# Patient Record
Sex: MALE | Race: BLACK OR AFRICAN AMERICAN | Employment: OTHER | URBAN - METROPOLITAN AREA
[De-identification: names, ages, dates, MRNs, and addresses within clinical notes are randomized per-mention and may not be internally consistent; named-entity substitution may affect disease eponyms.]

---

## 2023-07-30 ENCOUNTER — HOSPITAL ENCOUNTER (EMERGENCY)
Age: 73
Discharge: HOME | End: 2023-07-30
Attending: STUDENT IN AN ORGANIZED HEALTH CARE EDUCATION/TRAINING PROGRAM

## 2023-07-30 ENCOUNTER — ANCILLARY PROCEDURE (OUTPATIENT)
Dept: EMERGENCY DEPT | Age: 73
End: 2023-07-30
Attending: STUDENT IN AN ORGANIZED HEALTH CARE EDUCATION/TRAINING PROGRAM

## 2023-07-30 VITALS
WEIGHT: 202.8 LBS | BODY MASS INDEX: 28.39 KG/M2 | HEIGHT: 71 IN | TEMPERATURE: 98.9 F | DIASTOLIC BLOOD PRESSURE: 119 MMHG | SYSTOLIC BLOOD PRESSURE: 163 MMHG | RESPIRATION RATE: 18 BRPM | OXYGEN SATURATION: 96 % | HEART RATE: 87 BPM

## 2023-07-30 DIAGNOSIS — R42 LIGHTHEADEDNESS: Primary | ICD-10-CM

## 2023-07-30 DIAGNOSIS — R79.89 CREATININE ELEVATION: ICD-10-CM

## 2023-07-30 LAB
ANION GAP SERPL CALCULATED.3IONS-SCNC: 12 MMOL/L (ref 3–16)
BASOPHILS # BLD: 0 K/UL (ref 0–0.2)
BASOPHILS NFR BLD: 0.4 %
BUN SERPL-MCNC: 34 MG/DL (ref 7–20)
CALCIUM SERPL-MCNC: 10.6 MG/DL (ref 8.3–10.6)
CHLORIDE SERPL-SCNC: 99 MMOL/L (ref 99–110)
CO2 SERPL-SCNC: 28 MMOL/L (ref 21–32)
CREAT SERPL-MCNC: 2 MG/DL (ref 0.8–1.3)
DEPRECATED RDW RBC AUTO: 14.4 % (ref 12.4–15.4)
EOSINOPHIL # BLD: 0.2 K/UL (ref 0–0.6)
EOSINOPHIL NFR BLD: 3.3 %
GFR SERPLBLD CREATININE-BSD FMLA CKD-EPI: 34 ML/MIN/{1.73_M2}
GLUCOSE SERPL-MCNC: 117 MG/DL (ref 70–99)
HCT VFR BLD AUTO: 45.6 % (ref 40.5–52.5)
HGB BLD-MCNC: 15.2 G/DL (ref 13.5–17.5)
LYMPHOCYTES # BLD: 1.6 K/UL (ref 1–5.1)
LYMPHOCYTES NFR BLD: 33.4 %
MCH RBC QN AUTO: 30.9 PG (ref 26–34)
MCHC RBC AUTO-ENTMCNC: 33.3 G/DL (ref 31–36)
MCV RBC AUTO: 92.7 FL (ref 80–100)
MONOCYTES # BLD: 0.6 K/UL (ref 0–1.3)
MONOCYTES NFR BLD: 13.8 %
NEUTROPHILS # BLD: 2.3 K/UL (ref 1.7–7.7)
NEUTROPHILS NFR BLD: 49.1 %
PLATELET # BLD AUTO: 233 K/UL (ref 135–450)
PMV BLD AUTO: 9.7 FL (ref 5–10.5)
POTASSIUM SERPL-SCNC: 4.8 MMOL/L (ref 3.5–5.1)
RBC # BLD AUTO: 4.92 M/UL (ref 4.2–5.9)
SLIDE REVIEW: NORMAL
SODIUM SERPL-SCNC: 139 MMOL/L (ref 136–145)
WBC # BLD AUTO: 4.7 K/UL (ref 4–11)

## 2023-07-30 PROCEDURE — 93308 TTE F-UP OR LMTD: CPT

## 2023-07-30 PROCEDURE — 80048 BASIC METABOLIC PNL TOTAL CA: CPT

## 2023-07-30 PROCEDURE — 2580000003 HC RX 258: Performed by: STUDENT IN AN ORGANIZED HEALTH CARE EDUCATION/TRAINING PROGRAM

## 2023-07-30 PROCEDURE — 99284 EMERGENCY DEPT VISIT MOD MDM: CPT

## 2023-07-30 PROCEDURE — 93005 ELECTROCARDIOGRAM TRACING: CPT | Performed by: STUDENT IN AN ORGANIZED HEALTH CARE EDUCATION/TRAINING PROGRAM

## 2023-07-30 PROCEDURE — 85025 COMPLETE CBC W/AUTO DIFF WBC: CPT

## 2023-07-30 RX ORDER — 0.9 % SODIUM CHLORIDE 0.9 %
500 INTRAVENOUS SOLUTION INTRAVENOUS ONCE
Status: COMPLETED | OUTPATIENT
Start: 2023-07-30 | End: 2023-07-30

## 2023-07-30 RX ADMIN — SODIUM CHLORIDE 500 ML: 9 INJECTION, SOLUTION INTRAVENOUS at 19:04

## 2023-07-30 ASSESSMENT — PAIN - FUNCTIONAL ASSESSMENT: PAIN_FUNCTIONAL_ASSESSMENT: NONE - DENIES PAIN

## 2023-07-30 ASSESSMENT — LIFESTYLE VARIABLES
HOW OFTEN DO YOU HAVE A DRINK CONTAINING ALCOHOL: NEVER
HOW MANY STANDARD DRINKS CONTAINING ALCOHOL DO YOU HAVE ON A TYPICAL DAY: PATIENT DOES NOT DRINK

## 2023-08-01 LAB
EKG ATRIAL RATE: 65 BPM
EKG DIAGNOSIS: NORMAL
EKG P AXIS: 64 DEGREES
EKG P-R INTERVAL: 170 MS
EKG Q-T INTERVAL: 382 MS
EKG QRS DURATION: 74 MS
EKG QTC CALCULATION (BAZETT): 397 MS
EKG R AXIS: 29 DEGREES
EKG T AXIS: 28 DEGREES
EKG VENTRICULAR RATE: 65 BPM

## 2023-08-02 ENCOUNTER — HOSPITAL ENCOUNTER (EMERGENCY)
Age: 73
Discharge: HOME | End: 2023-08-03
Attending: EMERGENCY MEDICINE
Payer: MEDICARE

## 2023-08-02 ENCOUNTER — APPOINTMENT (OUTPATIENT)
Dept: CT IMAGING | Age: 73
End: 2023-08-02
Payer: MEDICARE

## 2023-08-02 ENCOUNTER — APPOINTMENT (OUTPATIENT)
Dept: GENERAL RADIOLOGY | Age: 73
End: 2023-08-02
Payer: MEDICARE

## 2023-08-02 DIAGNOSIS — R41.0 DELIRIUM: ICD-10-CM

## 2023-08-02 DIAGNOSIS — E86.1 HYPOTENSION DUE TO HYPOVOLEMIA: ICD-10-CM

## 2023-08-02 DIAGNOSIS — I95.89 HYPOTENSION DUE TO HYPOVOLEMIA: ICD-10-CM

## 2023-08-02 DIAGNOSIS — E86.0 DEHYDRATION: Primary | ICD-10-CM

## 2023-08-02 LAB
ALBUMIN SERPL-MCNC: 4.6 G/DL (ref 3.4–5)
ALP SERPL-CCNC: 90 U/L (ref 40–129)
ALT SERPL-CCNC: 29 U/L (ref 10–40)
ANION GAP SERPL CALCULATED.3IONS-SCNC: 14 MMOL/L (ref 3–16)
ANION GAP SERPL CALCULATED.3IONS-SCNC: 20 MMOL/L (ref 3–16)
AST SERPL-CCNC: 28 U/L (ref 15–37)
BASE EXCESS BLDV CALC-SCNC: -9.1 MMOL/L (ref -2–3)
BASOPHILS # BLD: 0 K/UL (ref 0–0.2)
BASOPHILS NFR BLD: 0.4 %
BILIRUB DIRECT SERPL-MCNC: <0.2 MG/DL (ref 0–0.3)
BILIRUB INDIRECT SERPL-MCNC: NORMAL MG/DL (ref 0–1)
BILIRUB SERPL-MCNC: <0.2 MG/DL (ref 0–1)
BUN SERPL-MCNC: 38 MG/DL (ref 7–20)
BUN SERPL-MCNC: 40 MG/DL (ref 7–20)
CALCIUM SERPL-MCNC: 10 MG/DL (ref 8.3–10.6)
CALCIUM SERPL-MCNC: 8.7 MG/DL (ref 8.3–10.6)
CHLORIDE SERPL-SCNC: 103 MMOL/L (ref 99–110)
CHLORIDE SERPL-SCNC: 106 MMOL/L (ref 99–110)
CO2 BLDV-SCNC: 18 MMOL/L
CO2 SERPL-SCNC: 17 MMOL/L (ref 21–32)
CO2 SERPL-SCNC: 19 MMOL/L (ref 21–32)
COHGB MFR BLDV: 0.4 % (ref 0–1.5)
CREAT SERPL-MCNC: 1.9 MG/DL (ref 0.8–1.3)
CREAT SERPL-MCNC: 2 MG/DL (ref 0.8–1.3)
DEPRECATED RDW RBC AUTO: 14.3 % (ref 12.4–15.4)
DO-HGB MFR BLDV: 6.6 %
EOSINOPHIL # BLD: 0.1 K/UL (ref 0–0.6)
EOSINOPHIL NFR BLD: 1.8 %
GFR SERPLBLD CREATININE-BSD FMLA CKD-EPI: 34 ML/MIN/{1.73_M2}
GFR SERPLBLD CREATININE-BSD FMLA CKD-EPI: 37 ML/MIN/{1.73_M2}
GLUCOSE SERPL-MCNC: 130 MG/DL (ref 70–99)
GLUCOSE SERPL-MCNC: 87 MG/DL (ref 70–99)
HCO3 BLDV-SCNC: 16.4 MMOL/L (ref 24–28)
HCT VFR BLD AUTO: 44.6 % (ref 40.5–52.5)
HGB BLD-MCNC: 14.9 G/DL (ref 13.5–17.5)
LACTATE BLDV-SCNC: 1.7 MMOL/L (ref 0.4–2)
LACTATE BLDV-SCNC: 3.4 MMOL/L (ref 0.4–1.9)
LACTATE BLDV-SCNC: 4.4 MMOL/L (ref 0.4–1.9)
LYMPHOCYTES # BLD: 1.3 K/UL (ref 1–5.1)
LYMPHOCYTES NFR BLD: 35.3 %
MCH RBC QN AUTO: 30.8 PG (ref 26–34)
MCHC RBC AUTO-ENTMCNC: 33.5 G/DL (ref 31–36)
MCV RBC AUTO: 92 FL (ref 80–100)
METHGB MFR BLDV: 0.6 % (ref 0–1.5)
MONOCYTES # BLD: 0.5 K/UL (ref 0–1.3)
MONOCYTES NFR BLD: 12.6 %
NEUTROPHILS # BLD: 1.8 K/UL (ref 1.7–7.7)
NEUTROPHILS NFR BLD: 49.9 %
NT-PROBNP SERPL-MCNC: 127 PG/ML (ref 0–124)
PCO2 BLDV: 33.7 MMHG (ref 41–51)
PH BLDV: 7.3 [PH] (ref 7.35–7.45)
PLATELET # BLD AUTO: 238 K/UL (ref 135–450)
PMV BLD AUTO: 9.4 FL (ref 5–10.5)
PO2 BLDV: 82.5 MMHG (ref 25–40)
POTASSIUM SERPL-SCNC: 4.1 MMOL/L (ref 3.5–5.1)
POTASSIUM SERPL-SCNC: 4.6 MMOL/L (ref 3.5–5.1)
PROT SERPL-MCNC: 8.2 G/DL (ref 6.4–8.2)
RBC # BLD AUTO: 4.85 M/UL (ref 4.2–5.9)
SAO2 % BLDV: 93 %
SODIUM SERPL-SCNC: 139 MMOL/L (ref 136–145)
SODIUM SERPL-SCNC: 140 MMOL/L (ref 136–145)
TROPONIN, HIGH SENSITIVITY: 15 NG/L (ref 0–22)
TROPONIN, HIGH SENSITIVITY: 20 NG/L (ref 0–22)
WBC # BLD AUTO: 3.6 K/UL (ref 4–11)

## 2023-08-02 PROCEDURE — 85025 COMPLETE CBC W/AUTO DIFF WBC: CPT

## 2023-08-02 PROCEDURE — 36415 COLL VENOUS BLD VENIPUNCTURE: CPT

## 2023-08-02 PROCEDURE — 2580000003 HC RX 258: Performed by: PHYSICIAN ASSISTANT

## 2023-08-02 PROCEDURE — 93005 ELECTROCARDIOGRAM TRACING: CPT | Performed by: PHYSICIAN ASSISTANT

## 2023-08-02 PROCEDURE — 80048 BASIC METABOLIC PNL TOTAL CA: CPT

## 2023-08-02 PROCEDURE — 71045 X-RAY EXAM CHEST 1 VIEW: CPT

## 2023-08-02 PROCEDURE — 96360 HYDRATION IV INFUSION INIT: CPT

## 2023-08-02 PROCEDURE — 84484 ASSAY OF TROPONIN QUANT: CPT

## 2023-08-02 PROCEDURE — 82803 BLOOD GASES ANY COMBINATION: CPT

## 2023-08-02 PROCEDURE — 83880 ASSAY OF NATRIURETIC PEPTIDE: CPT

## 2023-08-02 PROCEDURE — 83605 ASSAY OF LACTIC ACID: CPT

## 2023-08-02 PROCEDURE — 80076 HEPATIC FUNCTION PANEL: CPT

## 2023-08-02 PROCEDURE — 70450 CT HEAD/BRAIN W/O DYE: CPT

## 2023-08-02 PROCEDURE — 99284 EMERGENCY DEPT VISIT MOD MDM: CPT

## 2023-08-02 RX ORDER — TAMSULOSIN HYDROCHLORIDE 0.4 MG/1
0.4 CAPSULE ORAL
COMMUNITY
Start: 2018-03-16

## 2023-08-02 RX ORDER — LISINOPRIL AND HYDROCHLOROTHIAZIDE 20; 12.5 MG/1; MG/1
TABLET ORAL
COMMUNITY
Start: 2023-08-01

## 2023-08-02 RX ORDER — SODIUM CHLORIDE, SODIUM LACTATE, POTASSIUM CHLORIDE, AND CALCIUM CHLORIDE .6; .31; .03; .02 G/100ML; G/100ML; G/100ML; G/100ML
1000 INJECTION, SOLUTION INTRAVENOUS ONCE
Status: COMPLETED | OUTPATIENT
Start: 2023-08-02 | End: 2023-08-02

## 2023-08-02 RX ORDER — 0.9 % SODIUM CHLORIDE 0.9 %
1000 INTRAVENOUS SOLUTION INTRAVENOUS ONCE
Status: COMPLETED | OUTPATIENT
Start: 2023-08-02 | End: 2023-08-02

## 2023-08-02 RX ADMIN — SODIUM CHLORIDE 1000 ML: 9 INJECTION, SOLUTION INTRAVENOUS at 20:22

## 2023-08-02 RX ADMIN — SODIUM CHLORIDE, POTASSIUM CHLORIDE, SODIUM LACTATE AND CALCIUM CHLORIDE 1000 ML: 600; 310; 30; 20 INJECTION, SOLUTION INTRAVENOUS at 18:00

## 2023-08-02 ASSESSMENT — LIFESTYLE VARIABLES
HOW MANY STANDARD DRINKS CONTAINING ALCOHOL DO YOU HAVE ON A TYPICAL DAY: 3 OR 4
HOW OFTEN DO YOU HAVE A DRINK CONTAINING ALCOHOL: 2-3 TIMES A WEEK

## 2023-08-02 ASSESSMENT — PAIN - FUNCTIONAL ASSESSMENT
PAIN_FUNCTIONAL_ASSESSMENT: NONE - DENIES PAIN
PAIN_FUNCTIONAL_ASSESSMENT: NONE - DENIES PAIN

## 2023-08-02 NOTE — ED NOTES
Noted call from Lab staff re: Lactic Acid of 4.4. , patient has ongoing IV fluid. Vitally stable at present.      Carole Abdalla RN  08/02/23 1911

## 2023-08-03 VITALS
HEIGHT: 71 IN | SYSTOLIC BLOOD PRESSURE: 122 MMHG | OXYGEN SATURATION: 100 % | BODY MASS INDEX: 28.28 KG/M2 | RESPIRATION RATE: 14 BRPM | DIASTOLIC BLOOD PRESSURE: 66 MMHG | WEIGHT: 202 LBS | HEART RATE: 84 BPM | TEMPERATURE: 97.6 F

## 2023-08-03 LAB
EKG ATRIAL RATE: 101 BPM
EKG DIAGNOSIS: NORMAL
EKG P AXIS: 75 DEGREES
EKG P-R INTERVAL: 172 MS
EKG Q-T INTERVAL: 324 MS
EKG QRS DURATION: 80 MS
EKG QTC CALCULATION (BAZETT): 420 MS
EKG R AXIS: 79 DEGREES
EKG T AXIS: 43 DEGREES
EKG VENTRICULAR RATE: 101 BPM

## 2023-08-03 NOTE — ED PROVIDER NOTES
ED Attending Attestation Note     Date of evaluation: 8/2/2023    This patient was seen by the advance practice provider. I have seen and examined the patient, agree with the workup, evaluation, management and diagnosis. The care plan has been discussed. I have reviewed the ECG and concur with the LUIS ALFREDO's interpretation. My assessment reveals 69-year-old male who was found lethargic outside of his care facility today. Here he is confused but awake and conversant. Nissley hypotensive but responded well to IV fluids. Lactate elevated and coming down. Appears he has had episodes of syncope previously which were related to alcohol use. May be some combination of this and heat exposure. Physical Exam  Constitutional:       Appearance: He is ill-appearing and diaphoretic. HENT:      Head: Normocephalic and atraumatic. Cardiovascular:      Rate and Rhythm: Normal rate and regular rhythm. Pulmonary:      Effort: Pulmonary effort is normal.   Abdominal:      General: There is no distension. Palpations: Abdomen is soft. Skin:     General: Skin is warm. Neurological:      Mental Status: He is lethargic. GCS: GCS eye subscore is 4. GCS verbal subscore is 5. GCS motor subscore is 5. Cranial Nerves: No dysarthria or facial asymmetry.             Yuri Munguia MD  08/02/23 4833

## 2023-08-03 NOTE — DISCHARGE INSTRUCTIONS
confusion, loss of balance or new clumsiness  New Seizures  Significant Head Injury  Eye Injuries or new vision changes  Severe Nausea and Vomiting that prevents you from eating, drinking and/or taking your medications  Significant or persistent fevers (Above 100.3 F in babies, over 80 F in adults, as an example)  Chest Pain  Shortness of Breath  Sudden, severe pain  Cuts that won't stop bleeding  Significant Linder  Bleeding during Pregnancy in women  Testicular Pain in men    Your Plan of Care after leaving our Emergency Department     You should read the following instructions before leaving the Emergency Department. If you have any questions about this Plan of Care, please don't hesitate to ask. It is important that you understand this Plan of Care so that you can achieve the best possible outcome from your illness or injury. IMPORTANT INSTRUCTIONS:    Hydrate, Hydrate, Hydrate!! Make sure you are drinking plenty of water and maintaining good food intake. Especially on days where you are going to be walking out in the heat. You should follow-up with:  Rolo Dominique [address] [place] [address]  [phone]    Call   Call first thing in the morning to let dr. Salvador Gamino know about your last 2 visits to the ED and to set up follow up for re-check in the next 1-2 weeks. [facility] Emergency Department  [facility] [address]  [address]  [phone]  Go to         You should call the number of the providers listed above to schedule follow-up appointments in the timeline recommended or to speak to a healthcare provider. These providers also have on-call clinicians available after hours and on weekends for urgent questions and can be reached by calling the same number. If you feel your issue is an emergency, please don't hesitate to return to the emergency department for evaluation.  If you can not safely and quickly get yourself to the emergency department, call 9-1-1 and

## 2023-09-13 ASSESSMENT — ENCOUNTER SYMPTOMS
RHINORRHEA: 0
NAUSEA: 0
SORE THROAT: 0
CHEST TIGHTNESS: 0
COLOR CHANGE: 0
COUGH: 0
DIARRHEA: 0
TROUBLE SWALLOWING: 0
SHORTNESS OF BREATH: 0
VOMITING: 0
EYE PAIN: 0
WHEEZING: 0
ABDOMINAL PAIN: 0
ABDOMINAL DISTENTION: 0

## 2023-10-21 NOTE — ED NOTES
Pt educated on discharge instructions and given discharge papers. Pt verbalized understanding discharge with no questions nor concerns. Pt ambulated to exit with stable gait.      Akila Wallace RN  07/30/23 7956
[facility]

## 2024-07-15 ENCOUNTER — APPOINTMENT (OUTPATIENT)
Dept: GENERAL RADIOLOGY | Age: 74
DRG: 897 | End: 2024-07-15
Payer: COMMERCIAL

## 2024-07-15 ENCOUNTER — APPOINTMENT (OUTPATIENT)
Dept: CT IMAGING | Age: 74
DRG: 897 | End: 2024-07-15
Payer: COMMERCIAL

## 2024-07-15 ENCOUNTER — HOSPITAL ENCOUNTER (INPATIENT)
Age: 74
LOS: 1 days | Discharge: HOME HEALTH | DRG: 897 | End: 2024-07-17
Attending: EMERGENCY MEDICINE | Admitting: INTERNAL MEDICINE
Payer: COMMERCIAL

## 2024-07-15 DIAGNOSIS — N17.9 AKI (ACUTE KIDNEY INJURY) (HCC): ICD-10-CM

## 2024-07-15 DIAGNOSIS — R55 SYNCOPE, UNSPECIFIED SYNCOPE TYPE: ICD-10-CM

## 2024-07-15 DIAGNOSIS — R55 SYNCOPE AND COLLAPSE: Primary | ICD-10-CM

## 2024-07-15 LAB
ALBUMIN SERPL-MCNC: 3.5 G/DL (ref 3.4–5)
ALP SERPL-CCNC: 92 U/L (ref 40–129)
ALT SERPL-CCNC: 26 U/L (ref 10–40)
ANION GAP SERPL CALCULATED.3IONS-SCNC: 15 MMOL/L (ref 3–16)
APTT BLD: 32.4 SEC (ref 22.1–36.4)
AST SERPL-CCNC: 27 U/L (ref 15–37)
BASOPHILS # BLD: 0 K/UL (ref 0–0.2)
BASOPHILS NFR BLD: 0.6 %
BILIRUB DIRECT SERPL-MCNC: <0.2 MG/DL (ref 0–0.3)
BILIRUB INDIRECT SERPL-MCNC: NORMAL MG/DL (ref 0–1)
BILIRUB SERPL-MCNC: 0.4 MG/DL (ref 0–1)
BUN SERPL-MCNC: 29 MG/DL (ref 7–20)
CALCIUM SERPL-MCNC: 9.3 MG/DL (ref 8.3–10.6)
CHLORIDE SERPL-SCNC: 100 MMOL/L (ref 99–110)
CO2 SERPL-SCNC: 19 MMOL/L (ref 21–32)
CREAT SERPL-MCNC: 2.4 MG/DL (ref 0.8–1.3)
DEPRECATED RDW RBC AUTO: 13.9 % (ref 12.4–15.4)
EOSINOPHIL # BLD: 0.1 K/UL (ref 0–0.6)
EOSINOPHIL NFR BLD: 1.1 %
ETHANOLAMINE SERPL-MCNC: 69 MG/DL (ref 0–0.08)
GFR SERPLBLD CREATININE-BSD FMLA CKD-EPI: 28 ML/MIN/{1.73_M2}
GLUCOSE SERPL-MCNC: 100 MG/DL (ref 70–99)
HCT VFR BLD AUTO: 42.6 % (ref 40.5–52.5)
HGB BLD-MCNC: 14 G/DL (ref 13.5–17.5)
INR PPP: 1.06 (ref 0.85–1.15)
LYMPHOCYTES # BLD: 1.7 K/UL (ref 1–5.1)
LYMPHOCYTES NFR BLD: 33 %
MCH RBC QN AUTO: 29.9 PG (ref 26–34)
MCHC RBC AUTO-ENTMCNC: 32.9 G/DL (ref 31–36)
MCV RBC AUTO: 90.8 FL (ref 80–100)
MONOCYTES # BLD: 0.7 K/UL (ref 0–1.3)
MONOCYTES NFR BLD: 14.2 %
NEUTROPHILS # BLD: 2.6 K/UL (ref 1.7–7.7)
NEUTROPHILS NFR BLD: 51.1 %
NT-PROBNP SERPL-MCNC: <36 PG/ML (ref 0–449)
PLATELET # BLD AUTO: 202 K/UL (ref 135–450)
PMV BLD AUTO: 9.6 FL (ref 5–10.5)
POTASSIUM SERPL-SCNC: 5 MMOL/L (ref 3.5–5.1)
PROT SERPL-MCNC: 7.1 G/DL (ref 6.4–8.2)
PROTHROMBIN TIME: 14 SEC (ref 11.9–14.9)
RBC # BLD AUTO: 4.69 M/UL (ref 4.2–5.9)
SODIUM SERPL-SCNC: 134 MMOL/L (ref 136–145)
TROPONIN, HIGH SENSITIVITY: 24 NG/L (ref 0–22)
TROPONIN, HIGH SENSITIVITY: 25 NG/L (ref 0–22)
WBC # BLD AUTO: 5 K/UL (ref 4–11)

## 2024-07-15 PROCEDURE — 85730 THROMBOPLASTIN TIME PARTIAL: CPT

## 2024-07-15 PROCEDURE — 84484 ASSAY OF TROPONIN QUANT: CPT

## 2024-07-15 PROCEDURE — 70450 CT HEAD/BRAIN W/O DYE: CPT

## 2024-07-15 PROCEDURE — 71045 X-RAY EXAM CHEST 1 VIEW: CPT

## 2024-07-15 PROCEDURE — 99285 EMERGENCY DEPT VISIT HI MDM: CPT

## 2024-07-15 PROCEDURE — 85610 PROTHROMBIN TIME: CPT

## 2024-07-15 PROCEDURE — 83880 ASSAY OF NATRIURETIC PEPTIDE: CPT

## 2024-07-15 PROCEDURE — 93005 ELECTROCARDIOGRAM TRACING: CPT

## 2024-07-15 PROCEDURE — 2580000003 HC RX 258

## 2024-07-15 PROCEDURE — 80076 HEPATIC FUNCTION PANEL: CPT

## 2024-07-15 PROCEDURE — 36415 COLL VENOUS BLD VENIPUNCTURE: CPT

## 2024-07-15 PROCEDURE — 96361 HYDRATE IV INFUSION ADD-ON: CPT

## 2024-07-15 PROCEDURE — 80048 BASIC METABOLIC PNL TOTAL CA: CPT

## 2024-07-15 PROCEDURE — 85025 COMPLETE CBC W/AUTO DIFF WBC: CPT

## 2024-07-15 PROCEDURE — 82077 ASSAY SPEC XCP UR&BREATH IA: CPT

## 2024-07-15 PROCEDURE — 96360 HYDRATION IV INFUSION INIT: CPT

## 2024-07-15 RX ORDER — SODIUM CHLORIDE, SODIUM LACTATE, POTASSIUM CHLORIDE, AND CALCIUM CHLORIDE .6; .31; .03; .02 G/100ML; G/100ML; G/100ML; G/100ML
1000 INJECTION, SOLUTION INTRAVENOUS ONCE
Status: COMPLETED | OUTPATIENT
Start: 2024-07-15 | End: 2024-07-15

## 2024-07-15 RX ORDER — SODIUM CHLORIDE, SODIUM LACTATE, POTASSIUM CHLORIDE, AND CALCIUM CHLORIDE .6; .31; .03; .02 G/100ML; G/100ML; G/100ML; G/100ML
1000 INJECTION, SOLUTION INTRAVENOUS ONCE
Status: COMPLETED | OUTPATIENT
Start: 2024-07-16 | End: 2024-07-16

## 2024-07-15 RX ADMIN — SODIUM CHLORIDE, POTASSIUM CHLORIDE, SODIUM LACTATE AND CALCIUM CHLORIDE 1000 ML: 600; 310; 30; 20 INJECTION, SOLUTION INTRAVENOUS at 20:36

## 2024-07-15 ASSESSMENT — PAIN - FUNCTIONAL ASSESSMENT: PAIN_FUNCTIONAL_ASSESSMENT: 0-10

## 2024-07-15 ASSESSMENT — PAIN SCALES - GENERAL: PAINLEVEL_OUTOF10: 0

## 2024-07-16 ENCOUNTER — APPOINTMENT (OUTPATIENT)
Age: 74
DRG: 897 | End: 2024-07-16
Attending: INTERNAL MEDICINE
Payer: COMMERCIAL

## 2024-07-16 PROBLEM — R55 SYNCOPE AND COLLAPSE: Status: ACTIVE | Noted: 2024-07-16

## 2024-07-16 LAB
ANION GAP SERPL CALCULATED.3IONS-SCNC: 13 MMOL/L (ref 3–16)
BASOPHILS # BLD: 0 K/UL (ref 0–0.2)
BASOPHILS NFR BLD: 0.6 %
BUN SERPL-MCNC: 26 MG/DL (ref 7–20)
CALCIUM SERPL-MCNC: 9.2 MG/DL (ref 8.3–10.6)
CHLORIDE SERPL-SCNC: 103 MMOL/L (ref 99–110)
CO2 SERPL-SCNC: 21 MMOL/L (ref 21–32)
CREAT SERPL-MCNC: 1.8 MG/DL (ref 0.8–1.3)
DEPRECATED RDW RBC AUTO: 13.8 % (ref 12.4–15.4)
ECHO AO ASC DIAM: 3.1 CM
ECHO AO ASCENDING AORTA INDEX: 1.44 CM/M2
ECHO AO ROOT DIAM: 3.5 CM
ECHO AO ROOT INDEX: 1.62 CM/M2
ECHO BSA: 2.19 M2
ECHO LA AREA 2C: 21.1 CM2
ECHO LA AREA 4C: 15.5 CM2
ECHO LA MAJOR AXIS: 4.7 CM
ECHO LA MINOR AXIS: 5.8 CM
ECHO LA VOL BP: 54 ML (ref 18–58)
ECHO LA VOL MOD A2C: 62 ML (ref 18–58)
ECHO LA VOL MOD A4C: 38 ML (ref 18–58)
ECHO LA VOL/BSA BIPLANE: 25 ML/M2 (ref 16–34)
ECHO LA VOLUME INDEX MOD A2C: 29 ML/M2 (ref 16–34)
ECHO LA VOLUME INDEX MOD A4C: 18 ML/M2 (ref 16–34)
ECHO LV E' LATERAL VELOCITY: 8 CM/S
ECHO LV E' SEPTAL VELOCITY: 9 CM/S
ECHO LV EDV A4C: 90 ML
ECHO LV EDV INDEX A4C: 42 ML/M2
ECHO LV EJECTION FRACTION A4C: 43 %
ECHO LV ESV A4C: 51 ML
ECHO LV ESV INDEX A4C: 24 ML/M2
ECHO LV FRACTIONAL SHORTENING: 39 % (ref 28–44)
ECHO LV INTERNAL DIMENSION DIASTOLE INDEX: 1.9 CM/M2
ECHO LV INTERNAL DIMENSION DIASTOLIC: 4.1 CM (ref 4.2–5.9)
ECHO LV INTERNAL DIMENSION SYSTOLIC INDEX: 1.16 CM/M2
ECHO LV INTERNAL DIMENSION SYSTOLIC: 2.5 CM
ECHO LV IVSD: 0.9 CM (ref 0.6–1)
ECHO LV MASS 2D: 123 G (ref 88–224)
ECHO LV MASS INDEX 2D: 56.9 G/M2 (ref 49–115)
ECHO LV POSTERIOR WALL DIASTOLIC: 1 CM (ref 0.6–1)
ECHO LV RELATIVE WALL THICKNESS RATIO: 0.49
ECHO LVOT AREA: 4.2 CM2
ECHO LVOT DIAM: 2.3 CM
ECHO LVOT MEAN GRADIENT: 1 MMHG
ECHO LVOT PEAK GRADIENT: 2 MMHG
ECHO LVOT PEAK VELOCITY: 0.6 M/S
ECHO LVOT STROKE VOLUME INDEX: 23.8 ML/M2
ECHO LVOT SV: 51.5 ML
ECHO LVOT VTI: 12.4 CM
ECHO MV A VELOCITY: 0.72 M/S
ECHO MV AREA VTI: 2.8 CM2
ECHO MV E DECELERATION TIME (DT): 169 MS
ECHO MV E VELOCITY: 0.82 M/S
ECHO MV E/A RATIO: 1.14
ECHO MV E/E' LATERAL: 10.25
ECHO MV E/E' RATIO (AVERAGED): 9.68
ECHO MV E/E' SEPTAL: 9.11
ECHO MV LVOT VTI INDEX: 1.51
ECHO MV MAX VELOCITY: 0.8 M/S
ECHO MV MEAN GRADIENT: 1 MMHG
ECHO MV MEAN VELOCITY: 0.4 M/S
ECHO MV PEAK GRADIENT: 3 MMHG
ECHO MV REGURGITANT PEAK GRADIENT: 34 MMHG
ECHO MV REGURGITANT PEAK VELOCITY: 2.9 M/S
ECHO MV VTI: 18.7 CM
ECHO RA AREA 4C: 17.6 CM2
ECHO RA END SYSTOLIC VOLUME APICAL 4 CHAMBER INDEX BSA: 22 ML/M2
ECHO RA VOLUME: 48 ML
ECHO RV FREE WALL PEAK S': 17 CM/S
ECHO RV TAPSE: 2.1 CM (ref 1.7–?)
ECHO TV REGURGITANT MAX VELOCITY: 2.51 M/S
ECHO TV REGURGITANT PEAK GRADIENT: 25 MMHG
EKG ATRIAL RATE: 79 BPM
EKG ATRIAL RATE: 81 BPM
EKG DIAGNOSIS: NORMAL
EKG DIAGNOSIS: NORMAL
EKG P AXIS: 60 DEGREES
EKG P AXIS: 62 DEGREES
EKG P-R INTERVAL: 172 MS
EKG P-R INTERVAL: 174 MS
EKG Q-T INTERVAL: 368 MS
EKG Q-T INTERVAL: 380 MS
EKG QRS DURATION: 74 MS
EKG QRS DURATION: 80 MS
EKG QTC CALCULATION (BAZETT): 427 MS
EKG QTC CALCULATION (BAZETT): 435 MS
EKG R AXIS: 44 DEGREES
EKG R AXIS: 46 DEGREES
EKG T AXIS: 28 DEGREES
EKG T AXIS: 31 DEGREES
EKG VENTRICULAR RATE: 79 BPM
EKG VENTRICULAR RATE: 81 BPM
EOSINOPHIL # BLD: 0.1 K/UL (ref 0–0.6)
EOSINOPHIL NFR BLD: 0.9 %
GFR SERPLBLD CREATININE-BSD FMLA CKD-EPI: 39 ML/MIN/{1.73_M2}
GLUCOSE SERPL-MCNC: 82 MG/DL (ref 70–99)
HCT VFR BLD AUTO: 41.4 % (ref 40.5–52.5)
HGB BLD-MCNC: 13.7 G/DL (ref 13.5–17.5)
LYMPHOCYTES # BLD: 1.9 K/UL (ref 1–5.1)
LYMPHOCYTES NFR BLD: 29.4 %
MCH RBC QN AUTO: 30 PG (ref 26–34)
MCHC RBC AUTO-ENTMCNC: 33.1 G/DL (ref 31–36)
MCV RBC AUTO: 90.8 FL (ref 80–100)
MONOCYTES # BLD: 0.6 K/UL (ref 0–1.3)
MONOCYTES NFR BLD: 10 %
NEUTROPHILS # BLD: 3.8 K/UL (ref 1.7–7.7)
NEUTROPHILS NFR BLD: 59.1 %
PLATELET # BLD AUTO: 181 K/UL (ref 135–450)
PMV BLD AUTO: 9.5 FL (ref 5–10.5)
POTASSIUM SERPL-SCNC: 4.9 MMOL/L (ref 3.5–5.1)
RBC # BLD AUTO: 4.56 M/UL (ref 4.2–5.9)
SODIUM SERPL-SCNC: 137 MMOL/L (ref 136–145)
TROPONIN, HIGH SENSITIVITY: 22 NG/L (ref 0–22)
WBC # BLD AUTO: 6.3 K/UL (ref 4–11)

## 2024-07-16 PROCEDURE — 80048 BASIC METABOLIC PNL TOTAL CA: CPT

## 2024-07-16 PROCEDURE — 97530 THERAPEUTIC ACTIVITIES: CPT

## 2024-07-16 PROCEDURE — 93306 TTE W/DOPPLER COMPLETE: CPT | Performed by: INTERNAL MEDICINE

## 2024-07-16 PROCEDURE — 84484 ASSAY OF TROPONIN QUANT: CPT

## 2024-07-16 PROCEDURE — 36415 COLL VENOUS BLD VENIPUNCTURE: CPT

## 2024-07-16 PROCEDURE — 97166 OT EVAL MOD COMPLEX 45 MIN: CPT

## 2024-07-16 PROCEDURE — 97535 SELF CARE MNGMENT TRAINING: CPT

## 2024-07-16 PROCEDURE — 1200000000 HC SEMI PRIVATE

## 2024-07-16 PROCEDURE — 85025 COMPLETE CBC W/AUTO DIFF WBC: CPT

## 2024-07-16 PROCEDURE — 6360000002 HC RX W HCPCS: Performed by: INTERNAL MEDICINE

## 2024-07-16 PROCEDURE — 93306 TTE W/DOPPLER COMPLETE: CPT

## 2024-07-16 PROCEDURE — 2580000003 HC RX 258

## 2024-07-16 PROCEDURE — 2580000003 HC RX 258: Performed by: INTERNAL MEDICINE

## 2024-07-16 PROCEDURE — 6370000000 HC RX 637 (ALT 250 FOR IP): Performed by: INTERNAL MEDICINE

## 2024-07-16 PROCEDURE — 97116 GAIT TRAINING THERAPY: CPT

## 2024-07-16 PROCEDURE — 97162 PT EVAL MOD COMPLEX 30 MIN: CPT

## 2024-07-16 PROCEDURE — 99222 1ST HOSP IP/OBS MODERATE 55: CPT | Performed by: INTERNAL MEDICINE

## 2024-07-16 RX ORDER — ACETAMINOPHEN 650 MG/1
650 SUPPOSITORY RECTAL EVERY 6 HOURS PRN
Status: DISCONTINUED | OUTPATIENT
Start: 2024-07-16 | End: 2024-07-17 | Stop reason: HOSPADM

## 2024-07-16 RX ORDER — LORAZEPAM 1 MG/1
1 TABLET ORAL
Status: CANCELLED | OUTPATIENT
Start: 2024-07-16

## 2024-07-16 RX ORDER — LORAZEPAM 2 MG/ML
4 INJECTION INTRAMUSCULAR
Status: DISCONTINUED | OUTPATIENT
Start: 2024-07-16 | End: 2024-07-17 | Stop reason: HOSPADM

## 2024-07-16 RX ORDER — LORAZEPAM 1 MG/1
3 TABLET ORAL
Status: CANCELLED | OUTPATIENT
Start: 2024-07-16

## 2024-07-16 RX ORDER — MAGNESIUM SULFATE IN WATER 40 MG/ML
2000 INJECTION, SOLUTION INTRAVENOUS PRN
Status: DISCONTINUED | OUTPATIENT
Start: 2024-07-16 | End: 2024-07-17 | Stop reason: HOSPADM

## 2024-07-16 RX ORDER — LORAZEPAM 2 MG/ML
2 INJECTION INTRAMUSCULAR
Status: CANCELLED | OUTPATIENT
Start: 2024-07-16

## 2024-07-16 RX ORDER — POTASSIUM CHLORIDE 20 MEQ/1
40 TABLET, EXTENDED RELEASE ORAL PRN
Status: DISCONTINUED | OUTPATIENT
Start: 2024-07-16 | End: 2024-07-17 | Stop reason: HOSPADM

## 2024-07-16 RX ORDER — LORAZEPAM 1 MG/1
2 TABLET ORAL
Status: CANCELLED | OUTPATIENT
Start: 2024-07-16

## 2024-07-16 RX ORDER — ACETAMINOPHEN 325 MG/1
650 TABLET ORAL EVERY 6 HOURS PRN
Status: DISCONTINUED | OUTPATIENT
Start: 2024-07-16 | End: 2024-07-17 | Stop reason: HOSPADM

## 2024-07-16 RX ORDER — ONDANSETRON 4 MG/1
4 TABLET, ORALLY DISINTEGRATING ORAL EVERY 8 HOURS PRN
Status: DISCONTINUED | OUTPATIENT
Start: 2024-07-16 | End: 2024-07-17 | Stop reason: HOSPADM

## 2024-07-16 RX ORDER — SODIUM CHLORIDE 0.9 % (FLUSH) 0.9 %
5-40 SYRINGE (ML) INJECTION EVERY 12 HOURS SCHEDULED
Status: DISCONTINUED | OUTPATIENT
Start: 2024-07-16 | End: 2024-07-17 | Stop reason: HOSPADM

## 2024-07-16 RX ORDER — SODIUM CHLORIDE 9 MG/ML
INJECTION, SOLUTION INTRAVENOUS CONTINUOUS
Status: DISCONTINUED | OUTPATIENT
Start: 2024-07-16 | End: 2024-07-16

## 2024-07-16 RX ORDER — POLYETHYLENE GLYCOL 3350 17 G/17G
17 POWDER, FOR SOLUTION ORAL DAILY PRN
Status: DISCONTINUED | OUTPATIENT
Start: 2024-07-16 | End: 2024-07-17 | Stop reason: HOSPADM

## 2024-07-16 RX ORDER — POTASSIUM CHLORIDE 7.45 MG/ML
10 INJECTION INTRAVENOUS PRN
Status: DISCONTINUED | OUTPATIENT
Start: 2024-07-16 | End: 2024-07-17 | Stop reason: HOSPADM

## 2024-07-16 RX ORDER — GAUZE BANDAGE 2" X 2"
100 BANDAGE TOPICAL DAILY
Status: DISCONTINUED | OUTPATIENT
Start: 2024-07-16 | End: 2024-07-17 | Stop reason: HOSPADM

## 2024-07-16 RX ORDER — LORAZEPAM 2 MG/ML
1 INJECTION INTRAMUSCULAR
Status: CANCELLED | OUTPATIENT
Start: 2024-07-16

## 2024-07-16 RX ORDER — LORAZEPAM 2 MG/ML
3 INJECTION INTRAMUSCULAR
Status: CANCELLED | OUTPATIENT
Start: 2024-07-16

## 2024-07-16 RX ORDER — ENOXAPARIN SODIUM 100 MG/ML
30 INJECTION SUBCUTANEOUS DAILY
Status: DISCONTINUED | OUTPATIENT
Start: 2024-07-16 | End: 2024-07-16

## 2024-07-16 RX ORDER — ONDANSETRON 2 MG/ML
4 INJECTION INTRAMUSCULAR; INTRAVENOUS EVERY 6 HOURS PRN
Status: DISCONTINUED | OUTPATIENT
Start: 2024-07-16 | End: 2024-07-17 | Stop reason: HOSPADM

## 2024-07-16 RX ORDER — ENOXAPARIN SODIUM 100 MG/ML
40 INJECTION SUBCUTANEOUS DAILY
Status: DISCONTINUED | OUTPATIENT
Start: 2024-07-17 | End: 2024-07-17 | Stop reason: HOSPADM

## 2024-07-16 RX ORDER — SODIUM CHLORIDE 9 MG/ML
INJECTION, SOLUTION INTRAVENOUS PRN
Status: DISCONTINUED | OUTPATIENT
Start: 2024-07-16 | End: 2024-07-17 | Stop reason: HOSPADM

## 2024-07-16 RX ORDER — LORAZEPAM 1 MG/1
4 TABLET ORAL
Status: CANCELLED | OUTPATIENT
Start: 2024-07-16

## 2024-07-16 RX ORDER — SODIUM CHLORIDE 0.9 % (FLUSH) 0.9 %
5-40 SYRINGE (ML) INJECTION PRN
Status: DISCONTINUED | OUTPATIENT
Start: 2024-07-16 | End: 2024-07-17 | Stop reason: HOSPADM

## 2024-07-16 RX ADMIN — SODIUM CHLORIDE: 9 INJECTION, SOLUTION INTRAVENOUS at 05:50

## 2024-07-16 RX ADMIN — SODIUM CHLORIDE, SODIUM LACTATE, POTASSIUM CHLORIDE, AND CALCIUM CHLORIDE 1000 ML: .6; .31; .03; .02 INJECTION, SOLUTION INTRAVENOUS at 00:05

## 2024-07-16 RX ADMIN — SODIUM CHLORIDE, PRESERVATIVE FREE 10 ML: 5 INJECTION INTRAVENOUS at 20:43

## 2024-07-16 RX ADMIN — Medication 100 MG: at 09:11

## 2024-07-16 RX ADMIN — ENOXAPARIN SODIUM 30 MG: 100 INJECTION SUBCUTANEOUS at 09:11

## 2024-07-16 ASSESSMENT — PAIN SCALES - GENERAL: PAINLEVEL_OUTOF10: 0

## 2024-07-16 ASSESSMENT — LIFESTYLE VARIABLES
HOW OFTEN DO YOU HAVE A DRINK CONTAINING ALCOHOL: 2-3 TIMES A WEEK
HOW MANY STANDARD DRINKS CONTAINING ALCOHOL DO YOU HAVE ON A TYPICAL DAY: 3 OR 4

## 2024-07-16 NOTE — H&P
deficits.  Patient knew that he was at [facility], knew the year 2024, month July, did not know the date.  Following commands appropriately.  PSYC  - Appropriate affect.       Past Medical History:   Reviewed patient's past medical, surgical, social, family history and allergies.      PMHx   Past Medical History:   Diagnosis Date    Hypertension     Seasonal allergies      PSHX: Cholecystectomy, cervical spine fusion, right total knee arthroplasty  Allergies: Aspirin-GI upset  Fam HX: Diabetes, hypertension  Soc HX: Denies smoking, admits to alcohol use, denies any illicit drug use  Social History     Socioeconomic History    Marital status:    Substance and Sexual Activity    Alcohol use: Yes       Medications:   Medications:    lactated ringers  1,000 mL IntraVENous Once      Infusions:   PRN Meds:      Labs      CBC:   Recent Labs     07/15/24  2103   WBC 5.0   HGB 14.0        BMP:    Recent Labs     07/15/24  2104   *   K 5.0      CO2 19*   BUN 29*   CREATININE 2.4*   GLUCOSE 100*     Hepatic:   Recent Labs     07/15/24  2104   AST 27   ALT 26   BILITOT 0.4   ALKPHOS 92     Lipids: No results found for: \"CHOL\", \"HDL\", \"TRIG\"  Hemoglobin A1C: No results found for: \"LABA1C\"  TSH: No results found for: \"TSH\"  Troponin: No results found for: \"TROPONINT\"  Lactic Acid: No results for input(s): \"LACTA\" in the last 72 hours.  BNP:   Recent Labs     07/15/24  2104   PROBNP <36     UA:No results found for: \"NITRU\", \"COLORU\", \"PHUR\", \"LABCAST\", \"WBCUA\", \"RBCUA\", \"MUCUS\", \"TRICHOMONAS\", \"YEAST\", \"BACTERIA\", \"CLARITYU\", \"SPECGRAV\", \"LEUKOCYTESUR\", \"UROBILINOGEN\", \"BILIRUBINUR\", \"BLOODU\", \"GLUCOSEU\", \"KETUA\", \"AMORPHOUS\"  Urine Cultures: No results found for: \"LABURIN\"  Blood Cultures: No results found for: \"BC\"  No results found for: \"BLOODCULT2\"  Organism: No results found for: \"ORG\"    Imaging/Diagnostics Last 24 Hours   CT Head W/O Contrast    Result Date: 7/16/2024  HISTORY: Altered mental

## 2024-07-16 NOTE — ED PROVIDER NOTES
[facility]  EMERGENCY DEPARTMENT ENCOUNTER          PHYSICIAN ASSISTANT NOTE       Date of evaluation: 7/15/2024    Chief Complaint     Alcohol Intoxication (Pt brought in by EMS for alcohol intoxication, called by friend. Per EMS, blood pressure 62/27 en route. Pt alert and confused upon arrival ) and Hypotension      History of Present Illness     El Molina is a 73 y.o. male with a history of syncope, alcohol use who presents with hypotension and alcohol intoxication.  The patient presents via EMS.  EMS states that they were called by the patient's friend.  They were in a car outside of a jail home, presumably drinking in the car.  EMS is not sure why they were called, they assumed that it was because he was so intoxicated.  The patient admits to drinking a small bottle of liquor today, is unable to specify what he was drinking.  The patient is able to say that he is not in any pain, he has no complaints, states that he has not had any falls today.  Otherwise history is very difficult and limited. BP reportedly 60s/20s    Review of Systems     Review of Systems   Unable to perform ROS: Mental status change   Denies pain, falls    Past Medical, Surgical, Family, and Social History     He has a past medical history of Hypertension and Seasonal allergies.  He has no past surgical history on file.  His family history is not on file.  He reports current alcohol use.    Medications     Previous Medications    LISINOPRIL-HYDROCHLOROTHIAZIDE (PRINZIDE;ZESTORETIC) 20-12.5 MG PER TABLET        TAMSULOSIN (FLOMAX) 0.4 MG CAPSULE    Take 1 capsule by mouth Daily with lunch       Allergies     He has No Known Allergies.    Physical Exam     INITIAL VITALS: BP: (!) 58/38, Temp: 97.9 °F (36.6 °C), Pulse: 92, Respirations: 20, SpO2: 95 %  Physical Exam  Constitutional:       General: He is not in acute distress.     Appearance: He is normal weight. He is ill-appearing. He is not toxic-appearing.   HENT:

## 2024-07-16 NOTE — ED NOTES
ED TO INPATIENT SBAR HANDOFF    Patient Name: El Molina   :  1950  73 y.o.   MRN:  [id number]  Preferred Name    ED Room #:  A10/A10-10  Family/Caregiver Present no   Restraints no   Sitter no   Sepsis Risk Score      Situation  Code Status: No Order No additional code details.    Allergies: Patient has no known allergies.  Weight: Patient Vitals for the past 96 hrs (Last 3 readings):   Weight   07/15/24 2010 96.6 kg (212 lb 14.4 oz)     Arrived from: home  Chief Complaint:   Chief Complaint   Patient presents with    Alcohol Intoxication     Pt brought in by EMS for alcohol intoxication, called by friend. Per EMS, blood pressure 62/27 en route. Pt alert and confused upon arrival     Hypotension     Hospital Problem/Diagnosis:  Principal Problem:    Syncope and collapse  Resolved Problems:    * No resolved hospital problems. *    Imaging:   CT Head W/O Contrast   Final Result      1. Stable exam with no acute intracranial abnormality.      Electronically signed by Fady Galeano MD      XR CHEST PORTABLE   Final Result   No acute findings.      Electronically signed by Fly Toro        Abnormal labs:   Abnormal Labs Reviewed   BASIC METABOLIC PANEL W/ REFLEX TO MG FOR LOW K - Abnormal; Notable for the following components:       Result Value    Sodium 134 (*)     CO2 19 (*)     Glucose 100 (*)     BUN 29 (*)     Creatinine 2.4 (*)     Est, Glom Filt Rate 28 (*)     All other components within normal limits   TROPONIN - Abnormal; Notable for the following components:    Troponin, High Sensitivity 24 (*)     All other components within normal limits   TROPONIN - Abnormal; Notable for the following components:    Troponin, High Sensitivity 25 (*)     All other components within normal limits     Critical values: no     Abnormal Assessment Findings:     Background  History:   Past Medical History:   Diagnosis Date    Hypertension     Seasonal allergies        Assessment    Vitals/MEWS: MEWS Score: 4

## 2024-07-16 NOTE — ED NOTES
Patient weaned off of o2 cannula, continues to sat about 95% on room air     Xochitl Bernard RN  07/16/24 0148

## 2024-07-16 NOTE — FLOWSHEET NOTE
4 Eyes Skin Assessment     NAME:  El Molina  YOB: 1950  MEDICAL RECORD NUMBER:  [id number]    The patient is being assessed for  Admission    I agree that at least one RN has performed a thorough Head to Toe Skin Assessment on the patient. ALL assessment sites listed below have been assessed.      Areas assessed by both nurses:    Head, Face, Ears, Shoulders, Back, Chest, Arms, Elbows, Hands, Sacrum. Buttock, Coccyx, Ischium, Legs. Feet and Heels, Under Medical Devices , and Other .        Does the Patient have a Wound? No noted wound(s)       Crow Prevention initiated by RN: No  Wound Care Orders initiated by RN: No    Pressure Injury (Stage 3,4, Unstageable, DTI, NWPT, and Complex wounds) if present, place Wound referral order by RN under : No    New Ostomies, if present place, Ostomy referral order under : No     Nurse 1 eSignature: Electronically signed by Diane Razo RN on 7/16/24 at 4:19 AM EDT    **SHARE this note so that the co-signing nurse can place an eSignature**    Nurse 2 eSignature: Electronically signed by Td Yu RN on 7/16/24 at 5:40 AM EDT

## 2024-07-16 NOTE — FLOWSHEET NOTE
Problem: Discharge Planning  Goal: Discharge to home or other facility with appropriate resources  Outcome: Progressing  Flowsheets (Taken 2/20/2024 1920)  Discharge to home or other facility with appropriate resources: Identify barriers to discharge with patient and caregiver     Problem: Pain  Goal: Verbalizes/displays adequate comfort level or baseline comfort level  2/20/2024 2153 by Shawna Parks RN  Outcome: NOT PROGRESSING  Flowsheets (Taken 2/20/2024 1918)  Verbalizes/displays adequate comfort level or baseline comfort level: Encourage patient to monitor pain and request assistance     Problem: Skin/Tissue Integrity  Goal: Absence of new skin breakdown  Description: 1.  Monitor for areas of redness and/or skin breakdown  2.  Assess vascular access sites hourly  3.  Every 4-6 hours minimum:  Change oxygen saturation probe site  4.  Every 4-6 hours:  If on nasal continuous positive airway pressure, respiratory therapy assess nares and determine need for appliance change or resting period.  Outcome: Progressing     Problem: Safety - Adult  Goal: Free from fall injury  2/20/2024 2153 by Shawna Parks RN  Outcome: Progressing       Problem: ABCDS Injury Assessment  Goal: Absence of physical injury  Outcome: Progressing     Problem: Skin/Tissue Integrity - Adult  Goal: Skin integrity remains intact  Outcome: Progressing  Flowsheets  Taken 2/20/2024 2152  Skin Integrity Remains Intact: Monitor for areas of redness and/or skin breakdown  Taken 2/20/2024 1920  Skin Integrity Remains Intact: Monitor for areas of redness and/or skin breakdown  Goal: Incisions, wounds, or drain sites healing without S/S of infection  Outcome: Progressing  Flowsheets  Taken 2/20/2024 2152  Incisions, Wounds, or Drain Sites Healing Without Sign and Symptoms of Infection: ADMISSION and DAILY: Assess and document risk factors for pressure ulcer development  Taken 2/20/2024 1920  Incisions, Wounds, or Drain Sites Healing Without Sign  73 y.o black american man admitted to room #2699 under care of Dr. Marie for syncope and collapse. Pt arrived per stretcher with belongings . Pt is alert and oriented and cooperative. Pt was able to participate in orthostatic b/p collection from stretcher to bed. Pt educated on safety protocol, call light usage and bed and chair alarms as well as plan of care and active orders. Pt verbalized understanding. No skin issues with respect to some overgrown toenails and ble edema pitting, and some lumps on back below neck and above shoulders. Pt reported that those have bee n there for some time. IV site to left hand with dressing c/d/I. Orthostatics negative. Call light at fingertips. Bed alarm activated and functioning properly.    and Symptoms of Infection: ADMISSION and DAILY: Assess and document risk factors for pressure ulcer development     Problem: Musculoskeletal - Adult  Goal: Return mobility to safest level of function  2/20/2024 2153 by Shawna Parks RN  Outcome: Progressing  Flowsheets (Taken 2/20/2024 1920)  Return Mobility to Safest Level of Function: Assess patient stability and activity tolerance for standing, transferring and ambulating with or without assistive devices     Problem: Genitourinary - Adult  Goal: Absence of urinary retention  Outcome: Progressing  Flowsheets (Taken 2/20/2024 1920)  Absence of urinary retention: Assess patient’s ability to void and empty bladder     Problem: Infection - Adult  Goal: Absence of infection at discharge  Outcome: Progressing  Flowsheets (Taken 2/20/2024 1920)  Absence of infection at discharge: Assess and monitor for signs and symptoms of infection  Goal: Absence of infection during hospitalization  Outcome: Progressing  Flowsheets (Taken 2/20/2024 1920)  Absence of infection during hospitalization: Assess and monitor for signs and symptoms of infection     Problem: Metabolic/Fluid and Electrolytes - Adult  Goal: Glucose maintained within prescribed range  Outcome: Progressing  Flowsheets (Taken 2/20/2024 1920)  Glucose maintained within prescribed range: Monitor blood glucose as ordered     Problem: Hematologic - Adult  Goal: Maintains hematologic stability  Outcome: Progressing  Flowsheets (Taken 2/20/2024 1920)  Maintains hematologic stability: Assess for signs and symptoms of bleeding or hemorrhage     Problem: Chronic Conditions and Co-morbidities  Goal: Patient's chronic conditions and co-morbidity symptoms are monitored and maintained or improved  Outcome: Progressing  Flowsheets (Taken 2/20/2024 1920)  Care Plan - Patient's Chronic Conditions and Co-Morbidity Symptoms are Monitored and Maintained or Improved: Monitor and assess patient's chronic conditions and comorbid

## 2024-07-16 NOTE — PLAN OF CARE
Problem: Discharge Planning  Goal: Discharge to home or other facility with appropriate resources  Outcome: Progressing  Flowsheets (Taken 7/16/2024 7333)  Discharge to home or other facility with appropriate resources: Identify barriers to discharge with patient and caregiver     Problem: Safety - Adult  Goal: Free from fall injury  Outcome: Progressing

## 2024-07-16 NOTE — ED PROVIDER NOTES
ED Attending Attestation Note     Date of evaluation: 7/15/2024    This patient was seen by the advance practice provider.  I have seen and examined the patient, agree with the workup, evaluation, management and diagnosis. The care plan has been discussed.  My assessment reveals patient that presents with altered mental status.  Patient reportedly found in car drinking with a friend.Patient responds to loud stimuli.  He is hypotensive on initial blood pressure reading.  Abdomen is protuberant.  It is soft without any facial grimacing.      Jose Guadalupe Hernandez MD  07/15/24 2030

## 2024-07-16 NOTE — PLAN OF CARE
Problem: Discharge Planning  Goal: Discharge to home or other facility with appropriate resources  7/16/2024 1655 by Courtney Sharif, RN  Outcome: Progressing  Plan is for patient to be discharged back to home.      Problem: Safety - Adult  Goal: Free from fall injury  7/16/2024 1655 by Courtney Sharif, RN  Outcome: Progressing   Patient is feeling weak but is better than this morning.  He was ablt to walk int he halls with a walker

## 2024-07-16 NOTE — CARE COORDINATION
discharge: Home Care (no preference of agency)            Potential DME:  none  Patient expects to discharge to: Apartment  Plan for transportation at discharge:  family private car    Financial    Payor: [employer] / Plan: [employer] / Product Type: *No Product type* /     Does insurance require precert for SNF: Yes    Potential assistance Purchasing Medications: No  Meds-to-Beds request:  no      [id number] - [place], [place] - [address] - P [phone] - F [phone]  [address]  [facility] [address]  Phone: [phone] Fax: [phone]      Notes:    Factors facilitating achievement of predicted outcomes: Family support, Friend support, Cooperative, and Pleasant    Barriers to discharge: Medical complications      Elida Davenport RN  Case Management Department  [phone]

## 2024-07-16 NOTE — ED TRIAGE NOTES
Pt brought in by EMS for alcohol intoxication, called by friend. Per EMS, blood pressure 62/27 en route. Pt alert and confused upon arrival

## 2024-07-16 NOTE — CONSULTS
upon the patient's clinical course and testing results.    I have spent 55 minutes of face to face time with the patient with more than 50% spent counseling and coordinating care.     All questions and concerns were addressed to the patient/family. Alternatives to my treatment were discussed. The note was completed using EMR. Every effort was made to ensure accuracy; however, inadvertent computerized transcription errors may be present. I have personally reviewed the reports and images of labs, radiological studies, cardiac studies including ECG's and telemetry, current and old medical records.     Electronically signed by Bandar Solis MD on 7/16/24 at 10:14 AM EDT

## 2024-07-17 ENCOUNTER — TELEPHONE (OUTPATIENT)
Dept: CARDIOLOGY CLINIC | Age: 74
End: 2024-07-17

## 2024-07-17 VITALS
SYSTOLIC BLOOD PRESSURE: 127 MMHG | WEIGHT: 212 LBS | RESPIRATION RATE: 16 BRPM | TEMPERATURE: 97.9 F | BODY MASS INDEX: 29.68 KG/M2 | DIASTOLIC BLOOD PRESSURE: 89 MMHG | HEART RATE: 67 BPM | OXYGEN SATURATION: 95 % | HEIGHT: 71 IN

## 2024-07-17 LAB
ANION GAP SERPL CALCULATED.3IONS-SCNC: 9 MMOL/L (ref 3–16)
BUN SERPL-MCNC: 19 MG/DL (ref 7–20)
CALCIUM SERPL-MCNC: 9.1 MG/DL (ref 8.3–10.6)
CHLORIDE SERPL-SCNC: 101 MMOL/L (ref 99–110)
CO2 SERPL-SCNC: 25 MMOL/L (ref 21–32)
CREAT SERPL-MCNC: 1.1 MG/DL (ref 0.8–1.3)
DEPRECATED RDW RBC AUTO: 13.9 % (ref 12.4–15.4)
GFR SERPLBLD CREATININE-BSD FMLA CKD-EPI: 70 ML/MIN/{1.73_M2}
GLUCOSE SERPL-MCNC: 103 MG/DL (ref 70–99)
HCT VFR BLD AUTO: 42.1 % (ref 40.5–52.5)
HGB BLD-MCNC: 14.3 G/DL (ref 13.5–17.5)
MCH RBC QN AUTO: 30.3 PG (ref 26–34)
MCHC RBC AUTO-ENTMCNC: 33.9 G/DL (ref 31–36)
MCV RBC AUTO: 89.3 FL (ref 80–100)
PLATELET # BLD AUTO: 185 K/UL (ref 135–450)
PMV BLD AUTO: 9.8 FL (ref 5–10.5)
POTASSIUM SERPL-SCNC: 3.8 MMOL/L (ref 3.5–5.1)
RBC # BLD AUTO: 4.71 M/UL (ref 4.2–5.9)
SODIUM SERPL-SCNC: 135 MMOL/L (ref 136–145)
WBC # BLD AUTO: 5.1 K/UL (ref 4–11)

## 2024-07-17 PROCEDURE — 99232 SBSQ HOSP IP/OBS MODERATE 35: CPT | Performed by: INTERNAL MEDICINE

## 2024-07-17 PROCEDURE — 36415 COLL VENOUS BLD VENIPUNCTURE: CPT

## 2024-07-17 PROCEDURE — 80048 BASIC METABOLIC PNL TOTAL CA: CPT

## 2024-07-17 PROCEDURE — 6370000000 HC RX 637 (ALT 250 FOR IP): Performed by: INTERNAL MEDICINE

## 2024-07-17 PROCEDURE — 6360000002 HC RX W HCPCS: Performed by: INTERNAL MEDICINE

## 2024-07-17 PROCEDURE — 85027 COMPLETE CBC AUTOMATED: CPT

## 2024-07-17 PROCEDURE — 2580000003 HC RX 258: Performed by: INTERNAL MEDICINE

## 2024-07-17 RX ADMIN — ENOXAPARIN SODIUM 40 MG: 100 INJECTION SUBCUTANEOUS at 08:38

## 2024-07-17 RX ADMIN — SODIUM CHLORIDE, PRESERVATIVE FREE 5 ML: 5 INJECTION INTRAVENOUS at 08:42

## 2024-07-17 RX ADMIN — Medication 100 MG: at 08:37

## 2024-07-17 NOTE — PLAN OF CARE
Problem: Discharge Planning  Goal: Discharge to home or other facility with appropriate resources  7/17/2024 1055 by Urmila Rojas, RN  Outcome: Progressing  Flowsheets (Taken 7/17/2024 1055)  Discharge to home or other facility with appropriate resources:   Identify barriers to discharge with patient and caregiver   Arrange for needed discharge resources and transportation as appropriate   Identify discharge learning needs (meds, wound care, etc)   Refer to discharge planning if patient needs post-hospital services based on physician order or complex needs related to functional status, cognitive ability or social support system     Problem: Safety - Adult  Goal: Free from fall injury  7/17/2024 1055 by Urmila Rojas, RN  Outcome: Progressing  Flowsheets (Taken 7/17/2024 1055)  Free From Fall Injury: Instruct family/caregiver on patient safety

## 2024-07-17 NOTE — PROGRESS NOTES
Pharmacist Review and Automatic Dose Adjustment of Prophylactic Enoxaparin    The reviewing pharmacist has made an adjustment to the ordered enoxaparin dose or converted to UFH per the approved [facility] protocol and table as identified below.      Plan: Based upon the patient's weight and renal function, the ordered enoxaparin dose of 30 mg daily has been changed  to 40 mg daily.    Weight 101.2 kg - just above cutoff for increase to 30 mg BID.  Given current SCr will keep at 40 mg daily for now    Please call with any questions    Manda Bhakta  [phone] (Main [facility])      El Molina is a 73 y.o. male.     Recent Labs     07/15/24  2104 07/16/24  0414   CREATININE 2.4* 1.8*       Estimated Creatinine Clearance: 44 mL/min (A) (based on SCr of 1.8 mg/dL (H)).    Recent Labs     07/15/24  2103 07/16/24  0414   HGB 14.0 13.7   HCT 42.6 41.4    181     Recent Labs     07/15/24  2103   INR 1.06       Height:   Ht Readings from Last 1 Encounters:   07/16/24 1.803 m (5' 11\")     Weight:  Wt Readings from Last 1 Encounters:   07/16/24 101.2 kg (223 lb 1.6 oz)                
Patient to ECHO  
Pt discharged to home. IV removed, discharge paperwork discussed. Pt agreeable to discharge plan. Pt walked to lobby to ride.   
Reviewed H&P written by Dr. Marie this morning. Agree with assessment and plan. Patient seen and examined, doing better today. Denies any lightheadedness or dizziness. Pt is afebrile, HDS with BP systolics 100s-110s, on room air.     Problem list   - Syncope likely related to hypotension/intoxication, possibly some component of dehydration   - Leg swelling, possibly related to CKD. Albumin 3.5  - CLARE on CKD: baseline cr appears to be around 1.8-2.0; on admission 2.4   - Hypertension on home lisinopril-HCTZ  - Alcohol use disorder    Assessment/Plan  - Bp 50s/30s on arrival, improved with fluids. Can dc maintenance NS, BP has been stable   - Orthostatics negative  - Lasix recently prescribed, hold for now   - TTE ordered   - Hold home antihypertensives   - Cardiology following  - JASMIN Oakley MD   
edema throughout    Strength RLE  Strength RLE: WFL  Comment: mod edema throughout  Strength LLE  Strength LLE: WFL  Comment: mod edema throughout              Transfers  Sit to Stand: Contact guard assistance (x2 trials with vc for  hand placement)  Stand to Sit: Contact guard assistance    Ambulation  Device: Rolling Walker  Assistance: Contact guard assistance  Quality of Gait: moderate shaun with increased KATHY/step length; increased weight shift side to side; fatigued quickly  Distance: 40 ft  Comments: Pt amb 50 ft without AD and CGA;moderate shaun with increased KATHY/step length; increased weight shift side to side; fatigued quickly. Gait improved with increased distance.          AM-PAC - Mobility    AM-PAC Basic Mobility - Inpatient   How much help is needed turning from your back to your side while in a flat bed without using bedrails?: A Little  How much help is needed moving from lying on your back to sitting on the side of a flat bed without using bedrails?: A Little  How much help is needed moving to and from a bed to a chair?: A Little  How much help is needed standing up from a chair using your arms?: A Little  How much help is needed walking in hospital room?: A Little  How much help is needed climbing 3-5 steps with a railing?: A Little  [facility] Inpatient Mobility Raw Score : 18  AM-PAC Inpatient T-Scale Score : 43.63  Mobility Inpatient CMS 0-100% Score: 46.58  Mobility Inpatient CMS G-Code Modifier : CK           Goals  Short Term Goals  Time Frame for Short Term Goals: discharge  Short Term Goal 1: sit to/from stand mod I  Short Term Goal 2: ambulate 150 ft with or without AD mod I  Patient Goals   Patient Goals : return home       Education  Patient Education  Education Given To: Patient  Education Provided: Role of Therapy  Education Method: Verbal  Barriers to Learning: Cognition  Education Outcome: Verbalized understanding;Continued education needed      Therapy Time   Individual Concurrent 
were addressed to the patient/family. Alternatives to my treatment were discussed. The note was completed using EMR. Every effort was made to ensure accuracy; however, inadvertent computerized transcription errors may be present. I have personally reviewed the reports and images of labs, radiological studies, cardiac studies including ECG's and telemetry, current and old medical records.     I would like to thank you for providing me the opportunity to participate in the care of your patient. If you have any questions, please do not hesitate to contact me.     Bandar Solis MD,  [facility] [place] [address]  Ph: [phone]  Fax: [phone]  
Dominance  Hand Dominance: Right     AM-PAC - ADL  AM-PAC Daily Activity - Inpatient   How much help is needed for putting on and taking off regular lower body clothing?: A Lot  How much help is needed for bathing (which includes washing, rinsing, drying)?: A Little  How much help is needed for toileting (which includes using toilet, bedpan, or urinal)?: A Little  How much help is needed for putting on and taking off regular upper body clothing?: None  How much help is needed for taking care of personal grooming?: None  How much help for eating meals?: None  [facility] Daily Activity Raw Score: 20  AM-PAC Inpatient ADL T-Scale Score : 42.03  ADL Inpatient CMS 0-100% Score: 38.32  ADL Inpatient CMS G-Code Modifier : CJ    Goals  Short Term Goals  Time Frame for Short Term Goals: at d/c  Short Term Goal 1: Stance x 6 mins with Supervision for ADLs/ IADLs  Short Term Goal 2: LE dressing with Supervision and AE prn  Short Term Goal 3: Functional transfer with Mod independence  Short Term Goal 4: Chair pushups x 5 reps x 2 sets with Supervision  Patient Goals   Patient goals : Feel better and move better     Therapy Time   Individual Concurrent Group Co-treatment   Time In 1045         Time Out 1124         Minutes 39            Timed Code Treatment Minutes:   24 mins     Total Treatment Minutes:  39 mins          Manda Davis OT

## 2024-07-17 NOTE — TELEPHONE ENCOUNTER
Per dr Solis, 7 Day CAM at discharge, syncope.   7 Day CAM ID #: [id number] placed.  Written and verbal instructions given to patient; verb understanding.   Results to Dr Solis.

## 2024-07-17 NOTE — DISCHARGE INSTRUCTIONS
El Molina [birth year]    Patient instructions for CAM monitor:  You will need to wear 1 monitor for 1 week, for a total of 7 days.  We will place your 7 day/1 week monitor today, Wednesday, 7/17/2024    REMOVE THE MONITOR ON Wednesday, 7/24/2024    On Wednesday, 7/24/2024, remove your monitor, place it in the box, and return it to the [facility], (see address below), between 8:30 AM and 4:30 PM. Leave the box with the  at the .       IF YOU CANNOT MAKE THIS APPOINTMENT, CALL THE [facility] AT  [phone] AND THEY WILL ASSIST YOU IN MAKING OTHER AR[place]EMENT[place] [address]  PHONE: [phone]           If the monitor comes off but has been in place at least 7 days place in box & return it to the [facility].  If it comes off sooner than 7 days please call the office and we will help you make arrangements to have it replaced.        Avoid excess sweating to maximize wear time.         You are able to shower after 24 hours, however have majority of water hitting back and not directly on monitor. Do not submerge in bath.  No Swimming while wearing the monitor.         If you experience any symptoms while wearing monitor push button and record in booklet.      For questions about monitor call: Customer Service [phone]

## 2024-07-17 NOTE — PLAN OF CARE
Problem: Discharge Planning  Goal: Discharge to home or other facility with appropriate resources  7/16/2024 2338 by Jodi Villatoro, RN  Outcome: Progressing     Problem: Safety - Adult  Goal: Free from fall injury  7/16/2024 2338 by Jodi Villatoro, RN  Outcome: Progressing  Note: At risk for fall.  Call light within reach, instructed to call whenever he need something.  Bed locked, alarm on, and maintain at it's lowest height.

## 2024-07-17 NOTE — DISCHARGE SUMMARY
V2.0  Discharge Summary    Name:  El Molina /Age/Sex: 1950 (73 y.o. male)   Admit Date: 7/15/2024  Discharge Date: 24    MRN & CSN:  [id number] & [id number] Encounter Date and Time 24 11:00 AM EDT    Attending:  Ana Paula Oakley MD Discharging Provider: Ana Paula Oakley MD     Hospital Course:     Brief HPI: El Molina is a 73 y.o. male with hypertension and alcohol use who presented with syncope.  Patient reports that he was in a car with his friend drinking alcohol when he reportedly lost consciousness; patient otherwise is not sure of the circumstances surrounding the event.  He was brought into the ED where he was noted to be hypotensive with blood pressure 58/38.  His hypotension responded to fluids after receiving 2 L of LR in the ED.  His labs were notable for an CLARE with creatinine of 2.4.  It was felt that his CLARE and syncopal event were due to hypotension with some component of dehydration and intoxication.  Patient was recently started on Lasix for ankle swelling in the outpatient setting, which he has been taking every day.  Home Lasix and lisinopril-hydrochlorothiazide were held during this admission and patient remained normotensive.  He had an echo to evaluate for heart failure and was found to have normal EF.  He was seen by cardiology and event monitor was placed, planned for 7 days and to be followed up in clinic.  He was advised to avoid alcohol and wear compression stockings to help with ankle swelling, likely venous insufficiency.  His creatinine on day of discharge was 1.1. His lasix and lisionpril-HCTZ were discontinued on discharge.     The patient expressed appropriate understanding of, and agreement with the discharge recommendations, medications, and plan.     Consults this admission:  IP CONSULT TO CARDIOLOGY    Discharge Diagnosis:   Syncope and collapse  CLARE on CKD  Alcohol use disorder  Hypertension well-controlled without antihypertensives    Discharge Instruction:

## 2024-09-09 ENCOUNTER — TELEPHONE (OUTPATIENT)
Dept: CARDIOLOGY CLINIC | Age: 74
End: 2024-09-09

## 2024-09-14 ENCOUNTER — HOSPITAL ENCOUNTER (EMERGENCY)
Age: 74
Discharge: HOME | End: 2024-09-14
Attending: EMERGENCY MEDICINE
Payer: COMMERCIAL

## 2024-09-14 ENCOUNTER — APPOINTMENT (OUTPATIENT)
Dept: GENERAL RADIOLOGY | Age: 74
End: 2024-09-14
Payer: COMMERCIAL

## 2024-09-14 VITALS
DIASTOLIC BLOOD PRESSURE: 72 MMHG | OXYGEN SATURATION: 100 % | TEMPERATURE: 98.2 F | SYSTOLIC BLOOD PRESSURE: 109 MMHG | RESPIRATION RATE: 14 BRPM | WEIGHT: 207.7 LBS | BODY MASS INDEX: 29.08 KG/M2 | HEART RATE: 75 BPM | HEIGHT: 71 IN

## 2024-09-14 DIAGNOSIS — E86.0 DEHYDRATION: ICD-10-CM

## 2024-09-14 DIAGNOSIS — R42 LIGHTHEADEDNESS: Primary | ICD-10-CM

## 2024-09-14 DIAGNOSIS — T67.5XXA HEAT EXHAUSTION, INITIAL ENCOUNTER: ICD-10-CM

## 2024-09-14 LAB
ANION GAP SERPL CALCULATED.3IONS-SCNC: 11 MMOL/L (ref 3–16)
BASOPHILS # BLD: 0 K/UL (ref 0–0.2)
BASOPHILS NFR BLD: 0.6 %
BUN SERPL-MCNC: 12 MG/DL (ref 7–20)
CALCIUM SERPL-MCNC: 9.6 MG/DL (ref 8.3–10.6)
CHLORIDE SERPL-SCNC: 103 MMOL/L (ref 99–110)
CO2 SERPL-SCNC: 26 MMOL/L (ref 21–32)
CREAT SERPL-MCNC: 1.2 MG/DL (ref 0.8–1.3)
DEPRECATED RDW RBC AUTO: 14.6 % (ref 12.4–15.4)
EOSINOPHIL # BLD: 0.1 K/UL (ref 0–0.6)
EOSINOPHIL NFR BLD: 2 %
GFR SERPLBLD CREATININE-BSD FMLA CKD-EPI: 63 ML/MIN/{1.73_M2}
GLUCOSE SERPL-MCNC: 117 MG/DL (ref 70–99)
HCT VFR BLD AUTO: 46 % (ref 40.5–52.5)
HGB BLD-MCNC: 15.3 G/DL (ref 13.5–17.5)
LYMPHOCYTES # BLD: 1.3 K/UL (ref 1–5.1)
LYMPHOCYTES NFR BLD: 31 %
MCH RBC QN AUTO: 29.7 PG (ref 26–34)
MCHC RBC AUTO-ENTMCNC: 33.2 G/DL (ref 31–36)
MCV RBC AUTO: 89.3 FL (ref 80–100)
MONOCYTES # BLD: 0.5 K/UL (ref 0–1.3)
MONOCYTES NFR BLD: 12.1 %
NEUTROPHILS # BLD: 2.4 K/UL (ref 1.7–7.7)
NEUTROPHILS NFR BLD: 54.3 %
NT-PROBNP SERPL-MCNC: 207 PG/ML (ref 0–449)
PLATELET # BLD AUTO: 261 K/UL (ref 135–450)
PMV BLD AUTO: 8.8 FL (ref 5–10.5)
POTASSIUM SERPL-SCNC: 3.9 MMOL/L (ref 3.5–5.1)
RBC # BLD AUTO: 5.16 M/UL (ref 4.2–5.9)
SODIUM SERPL-SCNC: 140 MMOL/L (ref 136–145)
TROPONIN, HIGH SENSITIVITY: 16 NG/L (ref 0–22)
TROPONIN, HIGH SENSITIVITY: 19 NG/L (ref 0–22)
WBC # BLD AUTO: 4.3 K/UL (ref 4–11)

## 2024-09-14 PROCEDURE — 2580000003 HC RX 258: Performed by: PHYSICIAN ASSISTANT

## 2024-09-14 PROCEDURE — 36415 COLL VENOUS BLD VENIPUNCTURE: CPT

## 2024-09-14 PROCEDURE — 71046 X-RAY EXAM CHEST 2 VIEWS: CPT

## 2024-09-14 PROCEDURE — 93005 ELECTROCARDIOGRAM TRACING: CPT | Performed by: PHYSICIAN ASSISTANT

## 2024-09-14 PROCEDURE — 84484 ASSAY OF TROPONIN QUANT: CPT

## 2024-09-14 PROCEDURE — 83880 ASSAY OF NATRIURETIC PEPTIDE: CPT

## 2024-09-14 PROCEDURE — 85025 COMPLETE CBC W/AUTO DIFF WBC: CPT

## 2024-09-14 PROCEDURE — 99285 EMERGENCY DEPT VISIT HI MDM: CPT

## 2024-09-14 PROCEDURE — 96361 HYDRATE IV INFUSION ADD-ON: CPT

## 2024-09-14 PROCEDURE — 96360 HYDRATION IV INFUSION INIT: CPT

## 2024-09-14 PROCEDURE — 80048 BASIC METABOLIC PNL TOTAL CA: CPT

## 2024-09-14 PROCEDURE — 2580000003 HC RX 258: Performed by: EMERGENCY MEDICINE

## 2024-09-14 RX ORDER — SODIUM CHLORIDE, SODIUM LACTATE, POTASSIUM CHLORIDE, AND CALCIUM CHLORIDE .6; .31; .03; .02 G/100ML; G/100ML; G/100ML; G/100ML
1000 INJECTION, SOLUTION INTRAVENOUS ONCE
Status: COMPLETED | OUTPATIENT
Start: 2024-09-14 | End: 2024-09-14

## 2024-09-14 RX ORDER — SODIUM CHLORIDE, SODIUM LACTATE, POTASSIUM CHLORIDE, CALCIUM CHLORIDE 600; 310; 30; 20 MG/100ML; MG/100ML; MG/100ML; MG/100ML
1000 INJECTION, SOLUTION INTRAVENOUS CONTINUOUS
Status: DISCONTINUED | OUTPATIENT
Start: 2024-09-14 | End: 2024-09-15 | Stop reason: HOSPADM

## 2024-09-14 RX ADMIN — SODIUM CHLORIDE, POTASSIUM CHLORIDE, SODIUM LACTATE AND CALCIUM CHLORIDE 1000 ML: 600; 310; 30; 20 INJECTION, SOLUTION INTRAVENOUS at 22:27

## 2024-09-14 RX ADMIN — SODIUM CHLORIDE, POTASSIUM CHLORIDE, SODIUM LACTATE AND CALCIUM CHLORIDE 1000 ML: 600; 310; 30; 20 INJECTION, SOLUTION INTRAVENOUS at 19:23

## 2024-09-14 ASSESSMENT — ENCOUNTER SYMPTOMS
COUGH: 0
NAUSEA: 0
SHORTNESS OF BREATH: 0
VOMITING: 0
ABDOMINAL PAIN: 0
DIARRHEA: 0
CHEST TIGHTNESS: 0

## 2024-09-14 ASSESSMENT — LIFESTYLE VARIABLES
HOW MANY STANDARD DRINKS CONTAINING ALCOHOL DO YOU HAVE ON A TYPICAL DAY: PATIENT DOES NOT DRINK
HOW OFTEN DO YOU HAVE A DRINK CONTAINING ALCOHOL: NEVER

## 2024-09-14 ASSESSMENT — PAIN SCALES - GENERAL: PAINLEVEL_OUTOF10: 3

## 2024-09-15 LAB
EKG ATRIAL RATE: 93 BPM
EKG DIAGNOSIS: NORMAL
EKG P AXIS: 57 DEGREES
EKG P-R INTERVAL: 170 MS
EKG Q-T INTERVAL: 358 MS
EKG QRS DURATION: 64 MS
EKG QTC CALCULATION (BAZETT): 445 MS
EKG R AXIS: 49 DEGREES
EKG T AXIS: 32 DEGREES
EKG VENTRICULAR RATE: 93 BPM

## 2024-09-18 RX ORDER — METOPROLOL TARTRATE 25 MG/1
12.5 TABLET, FILM COATED ORAL 2 TIMES DAILY
Qty: 90 TABLET | Refills: 3 | Status: SHIPPED | OUTPATIENT
Start: 2024-09-18

## 2025-05-28 ENCOUNTER — APPOINTMENT (OUTPATIENT)
Dept: GENERAL RADIOLOGY | Age: 75
End: 2025-05-28
Payer: COMMERCIAL

## 2025-05-28 ENCOUNTER — HOSPITAL ENCOUNTER (EMERGENCY)
Age: 75
Discharge: HOME | End: 2025-05-29
Attending: EMERGENCY MEDICINE
Payer: COMMERCIAL

## 2025-05-28 DIAGNOSIS — R60.0 BILATERAL LOWER EXTREMITY EDEMA: Primary | ICD-10-CM

## 2025-05-28 DIAGNOSIS — B88.8 INFESTATION BY BED BUG: ICD-10-CM

## 2025-05-28 LAB
ALBUMIN SERPL-MCNC: 3.6 G/DL (ref 3.4–5)
ALBUMIN/GLOB SERPL: 0.8 {RATIO} (ref 1.1–2.2)
ALP SERPL-CCNC: 91 U/L (ref 40–129)
ALT SERPL-CCNC: 25 U/L (ref 10–40)
ANION GAP SERPL CALCULATED.3IONS-SCNC: 14 MMOL/L (ref 3–16)
AST SERPL-CCNC: 31 U/L (ref 15–37)
BACTERIA URNS QL MICRO: ABNORMAL /HPF
BASE EXCESS BLDV CALC-SCNC: 3.6 MMOL/L (ref -2–3)
BASOPHILS # BLD: 0.1 K/UL (ref 0–0.2)
BASOPHILS NFR BLD: 1.3 %
BILIRUB SERPL-MCNC: 1.8 MG/DL (ref 0–1)
BILIRUB UR QL STRIP.AUTO: ABNORMAL
BUN SERPL-MCNC: 14 MG/DL (ref 7–20)
CALCIUM SERPL-MCNC: 9.7 MG/DL (ref 8.3–10.6)
CHLORIDE SERPL-SCNC: 99 MMOL/L (ref 99–110)
CLARITY UR: CLEAR
CO2 BLDV-SCNC: 32 MMOL/L
CO2 SERPL-SCNC: 25 MMOL/L (ref 21–32)
COHGB MFR BLDV: 1.6 % (ref 0–1.5)
COLOR UR: YELLOW
CREAT SERPL-MCNC: 1.1 MG/DL (ref 0.8–1.3)
DEPRECATED RDW RBC AUTO: 15.9 % (ref 12.4–15.4)
DO-HGB MFR BLDV: 60.9 %
EOSINOPHIL # BLD: 0 K/UL (ref 0–0.6)
EOSINOPHIL NFR BLD: 0.3 %
GFR SERPLBLD CREATININE-BSD FMLA CKD-EPI: 70 ML/MIN/{1.73_M2}
GLUCOSE SERPL-MCNC: 96 MG/DL (ref 70–99)
GLUCOSE UR STRIP.AUTO-MCNC: NEGATIVE MG/DL
HCO3 BLDV-SCNC: 30.1 MMOL/L (ref 24–28)
HCT VFR BLD AUTO: 46.5 % (ref 40.5–52.5)
HGB BLD-MCNC: 15.8 G/DL (ref 13.5–17.5)
HGB UR QL STRIP.AUTO: NEGATIVE
INR PPP: 1.25 (ref 0.85–1.15)
KETONES UR STRIP.AUTO-MCNC: 15 MG/DL
LEUKOCYTE ESTERASE UR QL STRIP.AUTO: NEGATIVE
LYMPHOCYTES # BLD: 1.4 K/UL (ref 1–5.1)
LYMPHOCYTES NFR BLD: 17.8 %
MCH RBC QN AUTO: 28.5 PG (ref 26–34)
MCHC RBC AUTO-ENTMCNC: 34 G/DL (ref 31–36)
MCV RBC AUTO: 83.8 FL (ref 80–100)
METHGB MFR BLDV: 0.2 % (ref 0–1.5)
MONOCYTES # BLD: 1.2 K/UL (ref 0–1.3)
MONOCYTES NFR BLD: 15.1 %
NEUTROPHILS # BLD: 5.1 K/UL (ref 1.7–7.7)
NEUTROPHILS NFR BLD: 65.5 %
NITRITE UR QL STRIP.AUTO: NEGATIVE
NT-PROBNP SERPL-MCNC: 64 PG/ML (ref 0–449)
PCO2 BLDV: 51.3 MMHG (ref 41–51)
PH BLDV: 7.38 [PH] (ref 7.35–7.45)
PH UR STRIP.AUTO: 6 [PH] (ref 5–8)
PLATELET # BLD AUTO: 211 K/UL (ref 135–450)
PMV BLD AUTO: 9.7 FL (ref 5–10.5)
PO2 BLDV: <30 MMHG (ref 25–40)
POTASSIUM SERPL-SCNC: 3.7 MMOL/L (ref 3.5–5.1)
PROT SERPL-MCNC: 8.4 G/DL (ref 6.4–8.2)
PROT UR STRIP.AUTO-MCNC: 30 MG/DL
PROTHROMBIN TIME: 15.8 SEC (ref 11.9–14.9)
RBC # BLD AUTO: 5.55 M/UL (ref 4.2–5.9)
RBC #/AREA URNS HPF: ABNORMAL /HPF (ref 0–4)
SAO2 % BLDV: 38 %
SODIUM SERPL-SCNC: 138 MMOL/L (ref 136–145)
SP GR UR STRIP.AUTO: 1.02 (ref 1–1.03)
TROPONIN, HIGH SENSITIVITY: 14 NG/L (ref 0–22)
TROPONIN, HIGH SENSITIVITY: 15 NG/L (ref 0–22)
UA COMPLETE W REFLEX CULTURE PNL UR: ABNORMAL
UA DIPSTICK W REFLEX MICRO PNL UR: YES
URN SPEC COLLECT METH UR: ABNORMAL
UROBILINOGEN UR STRIP-ACNC: >=8 E.U./DL
WBC # BLD AUTO: 7.9 K/UL (ref 4–11)
WBC #/AREA URNS HPF: ABNORMAL /HPF (ref 0–5)

## 2025-05-28 PROCEDURE — 80053 COMPREHEN METABOLIC PANEL: CPT

## 2025-05-28 PROCEDURE — 96374 THER/PROPH/DIAG INJ IV PUSH: CPT

## 2025-05-28 PROCEDURE — 71045 X-RAY EXAM CHEST 1 VIEW: CPT

## 2025-05-28 PROCEDURE — 82803 BLOOD GASES ANY COMBINATION: CPT

## 2025-05-28 PROCEDURE — 83880 ASSAY OF NATRIURETIC PEPTIDE: CPT

## 2025-05-28 PROCEDURE — 6360000002 HC RX W HCPCS: Performed by: EMERGENCY MEDICINE

## 2025-05-28 PROCEDURE — 93005 ELECTROCARDIOGRAM TRACING: CPT | Performed by: EMERGENCY MEDICINE

## 2025-05-28 PROCEDURE — 84484 ASSAY OF TROPONIN QUANT: CPT

## 2025-05-28 PROCEDURE — 99285 EMERGENCY DEPT VISIT HI MDM: CPT

## 2025-05-28 PROCEDURE — 85610 PROTHROMBIN TIME: CPT

## 2025-05-28 PROCEDURE — 81001 URINALYSIS AUTO W/SCOPE: CPT

## 2025-05-28 PROCEDURE — 85025 COMPLETE CBC W/AUTO DIFF WBC: CPT

## 2025-05-28 RX ORDER — FUROSEMIDE 10 MG/ML
20 INJECTION INTRAMUSCULAR; INTRAVENOUS ONCE
Status: COMPLETED | OUTPATIENT
Start: 2025-05-28 | End: 2025-05-28

## 2025-05-28 RX ORDER — FUROSEMIDE 20 MG/1
20 TABLET ORAL DAILY
Qty: 60 TABLET | Refills: 3 | Status: SHIPPED | OUTPATIENT
Start: 2025-05-28

## 2025-05-28 RX ORDER — TAMSULOSIN HYDROCHLORIDE 0.4 MG/1
0.4 CAPSULE ORAL
Qty: 30 CAPSULE | Refills: 3 | Status: SHIPPED | OUTPATIENT
Start: 2025-05-28

## 2025-05-28 RX ORDER — METOPROLOL TARTRATE 25 MG/1
12.5 TABLET, FILM COATED ORAL 2 TIMES DAILY
Qty: 90 TABLET | Refills: 3 | Status: SHIPPED | OUTPATIENT
Start: 2025-05-28

## 2025-05-28 RX ADMIN — FUROSEMIDE 20 MG: 10 INJECTION, SOLUTION INTRAMUSCULAR; INTRAVENOUS at 21:54

## 2025-05-28 ASSESSMENT — PAIN - FUNCTIONAL ASSESSMENT: PAIN_FUNCTIONAL_ASSESSMENT: 0-10

## 2025-05-28 ASSESSMENT — LIFESTYLE VARIABLES
HOW OFTEN DO YOU HAVE A DRINK CONTAINING ALCOHOL: 2-3 TIMES A WEEK
HOW MANY STANDARD DRINKS CONTAINING ALCOHOL DO YOU HAVE ON A TYPICAL DAY: 1 OR 2

## 2025-05-28 ASSESSMENT — PAIN DESCRIPTION - ORIENTATION: ORIENTATION: RIGHT

## 2025-05-28 ASSESSMENT — PAIN DESCRIPTION - LOCATION: LOCATION: ARM

## 2025-05-28 ASSESSMENT — PAIN SCALES - GENERAL: PAINLEVEL_OUTOF10: 8

## 2025-05-28 NOTE — ED PROVIDER NOTES
THE [facility]  EMERGENCY DEPARTMENT ENCOUNTER          ATTENDING PHYSICIAN NOTE       Date of evaluation: 5/28/2025    Chief Complaint     Lower extremity swelling      History of Present Illness     El Molina is a 74 y.o. male who presents to the [facility] from concern for lower extremity swelling.  Patient states that earlier today started developing shortness of breath and chest pain.  He believes this is due to his lower extremity swelling.  He called 911.  Chest pain is resolved by that time.  He is not reporting heaviness in his legs.  No falls or trauma.  Patient has not been taking his home medications including what he states is a water pill.  On chart review the patient does have history of hypertension.  As well as chronic lower extremity edema.  Additional listed history on chart review is Parkinson's disease, chronic alcohol use disorder, hepatitis C and hypertension.  Patient has been on Lasix in the past per chart review.    ASSESSMENT / PLAN  (MEDICAL DECISION MAKING)     INITIAL VITALS: BP: (!) 137/93, Temp: 98.7 °F (37.1 °C), Pulse: 91, Respirations: 20, SpO2: 97 %      El Molina is a 74 y.o. male who presents to the [facility] part with multiple complaints.  His largest concern is worsening lower extremity edema.  He does have history of chronic edema is previously been prescribed Lasix.  He has not been able to take any of his medications due to financial barriers.  He has a nonfocal neurological exam and clear breath sounds.  Low concern for pulmonary edema.  Will proceed with laboratory workup, chest x-ray and urinalysis.    EKG without acute signs of ischemia.  Troponin is reassuring x 2.  BNP is not elevated and chest x-ray without any signs of volume overload.  CBC and renal panel overall reassuring as well.  Patient does have tense lower extreme edema and he was given a dose of Lasix here and likely be discharged on this medication.    Pending at time of note writing his

## 2025-05-29 VITALS
BODY MASS INDEX: 28.35 KG/M2 | TEMPERATURE: 98.7 F | OXYGEN SATURATION: 99 % | SYSTOLIC BLOOD PRESSURE: 116 MMHG | WEIGHT: 202.5 LBS | RESPIRATION RATE: 16 BRPM | HEART RATE: 95 BPM | DIASTOLIC BLOOD PRESSURE: 86 MMHG | HEIGHT: 71 IN

## 2025-05-29 LAB
EKG ATRIAL RATE: 89 BPM
EKG DIAGNOSIS: NORMAL
EKG P AXIS: 59 DEGREES
EKG P-R INTERVAL: 156 MS
EKG Q-T INTERVAL: 344 MS
EKG QRS DURATION: 78 MS
EKG QTC CALCULATION (BAZETT): 418 MS
EKG R AXIS: 39 DEGREES
EKG T AXIS: 38 DEGREES
EKG VENTRICULAR RATE: 89 BPM

## 2025-05-29 NOTE — ED NOTES
Patient reviewed and discharged by MD, patient needs transportation to go back in his apartment     Travis Bass RN  05/29/25 0004

## 2025-05-29 NOTE — ED NOTES
Patient was taken by his friend for a ride going back home, IV cannula removed. After visit summary given and instructed. Patient verbalized understanding. Left ambulatory in no signs of distress     Travis Bass RN  05/29/25 0033

## 2025-05-29 NOTE — DISCHARGE INSTRUCTIONS
You were seen in the emerged part for swelling of your lower extremities.  The rest of your blood work was overall reassuring.  No signs of fluid buildup inside your lungs.  Your EKG did not show signs of a heart attack.  I am restarting you on your water pill to help with your lower extremity swelling.  Make sure that you are taking your other prescribed medications to help with your blood pressure as well as your difficulty with urinating.      Please follow-up with your primary care doctor within 7 days to be rechecked and have repeat labs done. One of your liver labs (bilirubin) was very mildly elevated today and should be rechecked within 1 week. It is not likely related to any of your other concerns today.    Return to the emergency department if you have any new or worsening symptoms.

## 2025-08-28 ENCOUNTER — TRANSCRIBE ORDERS (OUTPATIENT)
Dept: ADMINISTRATIVE | Age: 75
End: 2025-08-28

## 2025-08-28 DIAGNOSIS — F03.B0 MODERATE DEMENTIA, UNSPECIFIED DEMENTIA TYPE, UNSPECIFIED WHETHER BEHAVIORAL, PSYCHOTIC, OR MOOD DISTURBANCE OR ANXIETY (HCC): Primary | ICD-10-CM

## 2025-09-02 ENCOUNTER — HOSPITAL ENCOUNTER (OUTPATIENT)
Dept: CT IMAGING | Age: 75
Discharge: HOME | End: 2025-09-02

## 2025-09-02 DIAGNOSIS — F03.B0 MODERATE DEMENTIA, UNSPECIFIED DEMENTIA TYPE, UNSPECIFIED WHETHER BEHAVIORAL, PSYCHOTIC, OR MOOD DISTURBANCE OR ANXIETY (HCC): ICD-10-CM

## 2025-09-02 PROCEDURE — 70450 CT HEAD/BRAIN W/O DYE: CPT
